# Patient Record
Sex: MALE | Race: WHITE | NOT HISPANIC OR LATINO | Employment: OTHER | URBAN - METROPOLITAN AREA
[De-identification: names, ages, dates, MRNs, and addresses within clinical notes are randomized per-mention and may not be internally consistent; named-entity substitution may affect disease eponyms.]

---

## 2021-03-14 ENCOUNTER — HISTORICAL (OUTPATIENT)
Dept: ADMINISTRATIVE | Facility: HOSPITAL | Age: 66
End: 2021-03-14

## 2021-11-18 ENCOUNTER — HISTORICAL (OUTPATIENT)
Dept: RADIOLOGY | Facility: HOSPITAL | Age: 66
End: 2021-11-18

## 2021-11-29 ENCOUNTER — HISTORICAL (OUTPATIENT)
Dept: ADMINISTRATIVE | Facility: HOSPITAL | Age: 66
End: 2021-11-29

## 2021-11-29 LAB
ALBUMIN SERPL-MCNC: 4.5 G/DL (ref 3.8–4.8)
ALBUMIN/GLOB SERPL: 2 {RATIO} (ref 1.2–2.2)
ALP SERPL-CCNC: 97 IU/L (ref 44–121)
ALT SERPL-CCNC: 16 IU/L (ref 0–44)
AST SERPL-CCNC: 18 IU/L (ref 0–40)
BILIRUB SERPL-MCNC: 0.4 MG/DL (ref 0–1.2)
BUN SERPL-MCNC: 12 MG/DL (ref 8–27)
CALCIUM SERPL-MCNC: 9.4 MG/DL (ref 8.6–10.2)
CHLORIDE SERPL-SCNC: 103 MMOL/L (ref 96–106)
CHOLEST SERPL-MCNC: 183 MG/DL (ref 100–199)
CHOLEST/HDLC SERPL: 3.5 RATIO (ref 0–5)
CO2 SERPL-SCNC: 22 MMOL/L (ref 20–29)
CREAT SERPL-MCNC: 0.78 MG/DL (ref 0.76–1.27)
CREAT/UREA NIT SERPL: 15 (ref 10–24)
GLOBULIN SER-MCNC: 2.2 G/DL (ref 1.5–4.5)
GLUCOSE SERPL-MCNC: 112 MG/DL (ref 65–99)
HDLC SERPL-MCNC: 52 MG/DL
LDLC SERPL CALC-MCNC: 109 MG/DL (ref 0–99)
POTASSIUM SERPL-SCNC: 4.2 MMOL/L (ref 3.5–5.2)
PROT SERPL-MCNC: 6.7 G/DL (ref 6–8.5)
SODIUM SERPL-SCNC: 139 MMOL/L (ref 134–144)
TRIGL SERPL-MCNC: 121 MG/DL (ref 0–149)
VLDLC SERPL CALC-MCNC: 22 MG/DL (ref 5–40)

## 2021-12-02 ENCOUNTER — HISTORICAL (OUTPATIENT)
Dept: ADMINISTRATIVE | Facility: HOSPITAL | Age: 66
End: 2021-12-02

## 2022-04-10 ENCOUNTER — HISTORICAL (OUTPATIENT)
Dept: ADMINISTRATIVE | Facility: HOSPITAL | Age: 67
End: 2022-04-10

## 2022-04-25 VITALS
HEIGHT: 70 IN | SYSTOLIC BLOOD PRESSURE: 151 MMHG | DIASTOLIC BLOOD PRESSURE: 73 MMHG | WEIGHT: 180.31 LBS | BODY MASS INDEX: 25.81 KG/M2 | OXYGEN SATURATION: 95 %

## 2022-07-22 PROBLEM — F10.10 ALCOHOL ABUSE: Chronic | Status: ACTIVE | Noted: 2022-07-22

## 2022-07-22 PROBLEM — M1A.09X0 IDIOPATHIC CHRONIC GOUT OF MULTIPLE SITES WITHOUT TOPHUS: Status: ACTIVE | Noted: 2022-07-22

## 2022-07-22 PROBLEM — I10 PRIMARY HYPERTENSION: Chronic | Status: ACTIVE | Noted: 2022-07-22

## 2022-07-22 PROBLEM — Z72.0 TOBACCO ABUSE: Status: ACTIVE | Noted: 2022-07-22

## 2022-07-22 PROBLEM — N20.0 NEPHROLITHIASIS: Chronic | Status: ACTIVE | Noted: 2022-07-22

## 2022-07-22 PROBLEM — I10 PRIMARY HYPERTENSION: Status: ACTIVE | Noted: 2022-07-22

## 2022-07-22 PROBLEM — M1A.09X0 IDIOPATHIC CHRONIC GOUT OF MULTIPLE SITES WITHOUT TOPHUS: Chronic | Status: ACTIVE | Noted: 2022-07-22

## 2022-07-22 PROBLEM — F10.10 ALCOHOL ABUSE: Status: ACTIVE | Noted: 2022-07-22

## 2022-07-22 PROBLEM — B02.29 POST HERPETIC NEURALGIA: Chronic | Status: ACTIVE | Noted: 2022-07-22

## 2022-07-22 PROBLEM — N20.0 NEPHROLITHIASIS: Status: ACTIVE | Noted: 2022-07-22

## 2022-07-22 PROBLEM — L57.0 ACTINIC KERATOSIS: Status: ACTIVE | Noted: 2022-07-22

## 2022-07-22 PROBLEM — K57.90 DIVERTICULOSIS: Chronic | Status: ACTIVE | Noted: 2022-07-22

## 2022-07-22 PROBLEM — B02.29 POST HERPETIC NEURALGIA: Status: ACTIVE | Noted: 2022-07-22

## 2022-07-22 PROBLEM — E78.00 PURE HYPERCHOLESTEROLEMIA: Status: ACTIVE | Noted: 2022-07-22

## 2022-07-22 PROBLEM — Z72.0 TOBACCO ABUSE: Chronic | Status: ACTIVE | Noted: 2022-07-22

## 2022-07-22 PROBLEM — I71.40 ABDOMINAL AORTIC ANEURYSM (AAA) WITHOUT RUPTURE: Chronic | Status: ACTIVE | Noted: 2022-07-22

## 2022-07-22 PROBLEM — K57.90 DIVERTICULOSIS: Status: ACTIVE | Noted: 2022-07-22

## 2022-07-22 PROBLEM — I71.40 ABDOMINAL AORTIC ANEURYSM (AAA) WITHOUT RUPTURE: Status: ACTIVE | Noted: 2022-07-22

## 2022-07-22 PROBLEM — L57.0 ACTINIC KERATOSIS: Chronic | Status: ACTIVE | Noted: 2022-07-22

## 2022-07-22 PROBLEM — E78.00 PURE HYPERCHOLESTEROLEMIA: Chronic | Status: ACTIVE | Noted: 2022-07-22

## 2023-01-10 ENCOUNTER — HOSPITAL ENCOUNTER (OUTPATIENT)
Dept: RADIOLOGY | Facility: HOSPITAL | Age: 68
Discharge: HOME | End: 2023-01-10
Attending: INTERNAL MEDICINE
Payer: MEDICARE

## 2023-01-10 ENCOUNTER — HOSPITAL ENCOUNTER (OUTPATIENT)
Dept: CARDIOLOGY | Facility: HOSPITAL | Age: 68
Discharge: HOME | End: 2023-01-10
Attending: INTERNAL MEDICINE
Payer: MEDICARE

## 2023-01-10 DIAGNOSIS — I71.40 ABDOMINAL AORTIC ANEURYSM (AAA) WITHOUT RUPTURE: ICD-10-CM

## 2023-01-10 DIAGNOSIS — Z09 ENCOUNTER FOR FOLLOW-UP EXAMINATION AFTER COMPLETED TREATMENT FOR CONDITIONS OTHER THAN MALIGNANT NEOPLASM: ICD-10-CM

## 2023-01-10 DIAGNOSIS — I65.29 STENOSIS OF CAROTID ARTERY, UNSPECIFIED LATERALITY: ICD-10-CM

## 2023-01-10 DIAGNOSIS — I10 PRIMARY HYPERTENSION: ICD-10-CM

## 2023-01-10 LAB
LEFT CCA DIST DIAS: 12 CM/S
LEFT CCA DIST SYS: 59 CM/S
LEFT CCA PROX DIAS: 11 CM/S
LEFT CCA PROX SYS: 65 CM/S
LEFT ECA DIAS: 7 CM/S
LEFT ECA SYS: 103 CM/S
LEFT ICA DIST DIAS: 0 CM/S
LEFT ICA DIST SYS: 83 CM/S
LEFT ICA MID DIAS: 22 CM/S
LEFT ICA MID SYS: 89 CM/S
LEFT ICA PROX DIAS: 10 CM/S
LEFT ICA PROX SYS: 51 CM/S
LEFT VERTEBRAL DIAS: 10 CM/S
LEFT VERTEBRAL SYS: 64 CM/S
OHS CV CAROTID RIGHT ICA EDV HIGHEST: 15
OHS CV CAROTID ULTRASOUND LEFT ICA/CCA RATIO: 1.51
OHS CV CAROTID ULTRASOUND RIGHT ICA/CCA RATIO: 0.73
OHS CV PV CAROTID LEFT HIGHEST CCA: 65
OHS CV PV CAROTID LEFT HIGHEST ICA: 89
OHS CV PV CAROTID RIGHT HIGHEST CCA: 98
OHS CV PV CAROTID RIGHT HIGHEST ICA: 72
OHS CV US CAROTID LEFT HIGHEST EDV: 22
RIGHT CCA DIST DIAS: 14 CM/S
RIGHT CCA DIST SYS: 98 CM/S
RIGHT CCA PROX DIAS: 10 CM/S
RIGHT CCA PROX SYS: 82 CM/S
RIGHT ECA DIAS: 0 CM/S
RIGHT ECA SYS: 106 CM/S
RIGHT ICA DIST DIAS: 15 CM/S
RIGHT ICA DIST SYS: 72 CM/S
RIGHT ICA MID DIAS: 15 CM/S
RIGHT ICA MID SYS: 66 CM/S
RIGHT ICA PROX DIAS: 9 CM/S
RIGHT ICA PROX SYS: 67 CM/S
RIGHT VERTEBRAL DIAS: 0 CM/S
RIGHT VERTEBRAL SYS: 33 CM/S

## 2023-01-10 PROCEDURE — 93880 EXTRACRANIAL BILAT STUDY: CPT | Mod: 26,,, | Performed by: SPECIALIST

## 2023-01-10 PROCEDURE — 76775 US EXAM ABDO BACK WALL LIM: CPT | Mod: TC

## 2023-01-10 PROCEDURE — 93880 CV US DOPPLER CAROTID (CUPID ONLY): ICD-10-PCS | Mod: 26,,, | Performed by: SPECIALIST

## 2023-01-10 PROCEDURE — 93880 EXTRACRANIAL BILAT STUDY: CPT

## 2023-02-16 ENCOUNTER — HOSPITAL ENCOUNTER (OUTPATIENT)
Dept: RADIOLOGY | Facility: HOSPITAL | Age: 68
Discharge: HOME | End: 2023-02-16
Attending: INTERNAL MEDICINE
Payer: MEDICARE

## 2023-02-16 DIAGNOSIS — F17.210 SMOKING GREATER THAN 20 PACK YEARS: ICD-10-CM

## 2023-02-16 PROCEDURE — 71271 CT THORAX LUNG CANCER SCR C-: CPT | Mod: TC

## 2023-03-13 ENCOUNTER — CLINICAL SUPPORT (OUTPATIENT)
Dept: AUDIOLOGY | Facility: HOSPITAL | Age: 68
End: 2023-03-13
Payer: MEDICARE

## 2023-03-13 DIAGNOSIS — H91.93 BILATERAL HEARING LOSS, UNSPECIFIED HEARING LOSS TYPE: ICD-10-CM

## 2023-03-13 DIAGNOSIS — H90.3 SENSORINEURAL HEARING LOSS, BILATERAL: Primary | ICD-10-CM

## 2023-03-13 PROCEDURE — 92557 COMPREHENSIVE HEARING TEST: CPT | Performed by: AUDIOLOGIST

## 2023-03-13 PROCEDURE — 92567 TYMPANOMETRY: CPT | Performed by: AUDIOLOGIST

## 2023-03-13 NOTE — PROGRESS NOTES
Hearing Evaluation        Patient History: Mr. Barcenas is in for his initial hearing evaluation reporting a gradual decrease in hearing, onset years ago.  He also reports transient bilateral tinnitus and a history of occupational noise exposure.  Vertigo and middle ear issues are denied. All additional history is unremarkable.        Test Results:                    Pure Tone Testing:      Right ear:       Mild to moderately severe sensorineural hearing loss from 750-8kHz. Speech reception threshold is in agreement with puretone findings.  Discrimination score of 44% is considered poor.        Left ear:          Mild to moderately severe sensorineural hearing loss from 1k-8kHz. Speech reception threshold is in agreement with puretone findings.  Discrimination score of 88% is considered good.                                                                            Tympanometry:                                           Right ear:       could not seal      Left ear:          could not seal                                Interpretations:      Behavioral test findings indicate a slight asymmetric hearing loss at 2kHz (AD>AS). Speech reception thresholds obtained at 20dB, AU, and are in agreement with puretone findings. Speech discrimination scores of 44%, AD, and 88%, AS are considered poor/good.  Immittance measures unobtainable due to significant EAC hair preventing the ability to obtain a seal for measures, bilaterally.           Recommendations:   1. ENT medical evaluation due to newly identified asymmetric hearing loss and asymmetric speech discrim.  2. Amplification (most likely monaural due to poor discrim in right ear)   3. Annual hearing evaluations

## 2023-03-14 ENCOUNTER — OFFICE VISIT (OUTPATIENT)
Dept: OTOLARYNGOLOGY | Facility: CLINIC | Age: 68
End: 2023-03-14
Payer: MEDICARE

## 2023-03-14 VITALS
BODY MASS INDEX: 25.2 KG/M2 | SYSTOLIC BLOOD PRESSURE: 134 MMHG | HEART RATE: 68 BPM | WEIGHT: 176 LBS | DIASTOLIC BLOOD PRESSURE: 71 MMHG | TEMPERATURE: 98 F

## 2023-03-14 DIAGNOSIS — H90.3 SENSORINEURAL HEARING LOSS (SNHL) OF BOTH EARS: Primary | ICD-10-CM

## 2023-03-14 PROCEDURE — 99214 OFFICE O/P EST MOD 30 MIN: CPT | Mod: PBBFAC | Performed by: OTOLARYNGOLOGY

## 2023-03-14 NOTE — PROGRESS NOTES
Patient Name: Etienne Barcenas   YOB: 1955     Chief Complaint:   Chief Complaint   Patient presents with    bilateral hearing loss        History of Present Illness:  March 14, 2023:   67-year-old male with a history of occupational noise exposure referred by audiology for evaluation of asymmetric hearing loss.  The patient has noticed that for at least the past 10 years he has been developing hearing loss which has progressively worsened.  He has noticed that the hearing seems to be worse in the right ear.  He does have intermittent tinnitus involving both ears and this was also worse in the right ear.  Currently he has no ear pain or vertigo.  No headaches.  He does have a history of childhood ear infections but has not had any for sometimes.  No known history of ototoxic drug exposure.  Regarding his occupational noise exposure he does work as an  in his frequently working around compressors.  He will usually wear hearing protection when in those environments.  He does not wear hearing protection at home when doing things such as cutting grass.  Family history is unremarkable for hearing loss the patient is aware of.    He is noticed that been having more difficulty following conversations and frequently has to ask people to repeat things that to him.    Audiogram done yesterday showed asymmetric sensorineural hearing loss with the right ear being worse than the left.  In the right ear he had a mild-to-moderately severe sensorineural hearing loss 750-8000 hertz.  In the left ear he had a mild-to-moderately severe sensorineural hearing loss from 3510-9802 hertz.  Discrimination was 44% in the right ear and 88% in left ear with speech reception threshold of 20 decibels bilaterally.  Those results were reviewed with the patient today.  Tympanometry could not be obtained.    Past Medical History:  Past Medical History:   Diagnosis Date    Actinic keratosis 7/22/2022    Alcohol abuse  7/22/2022    Diverticulosis 7/22/2022    Idiopathic chronic gout of multiple sites without tophus 7/22/2022    Nephrolithiasis 7/22/2022    Post herpetic neuralgia 7/22/2022    Pure hypercholesterolemia 7/22/2022    Tobacco abuse 7/22/2022     History reviewed. No pertinent surgical history.    Review of Systems:  Unremarkable except as mentioned above.    Current Medications:  Current Outpatient Medications   Medication Sig    allopurinoL (ZYLOPRIM) 300 MG tablet TAKE ONE TABLET BY MOUTH EVERY DAY    amLODIPine (NORVASC) 10 MG tablet Take 10 mg by mouth once daily.    aspirin (ECOTRIN) 81 MG EC tablet Take 81 mg by mouth once daily.    carvediloL (COREG) 3.125 MG tablet Take 1 tablet (3.125 mg total) by mouth 2 (two) times daily with meals.    losartan (COZAAR) 50 MG tablet Take 1 tablet (50 mg total) by mouth once daily.    rosuvastatin (CRESTOR) 5 MG tablet Take 5 mg by mouth once daily.    temazepam (RESTORIL) 15 mg Cap 1-2 caps nightly as needed for insomnia    diclofenac sodium (VOLTAREN) 1 % Gel Apply 2 g topically 4 (four) times daily as needed (pain). (Patient not taking: Reported on 3/14/2023)     No current facility-administered medications for this visit.        Allergies:  Review of patient's allergies indicates:   Allergen Reactions    Fish containing products         Physical Exam:  Vital signs:   Vitals:    03/14/23 1310   BP: 134/71   Pulse: 68   Temp: 97.7 °F (36.5 °C)   Weight: 79.8 kg (176 lb)   General:  Well-developed well-nourished male in no acute distress.  Voice is normal.  Head and face:  Normocephalic.  No facial lesions.  No temporomandibular joint tenderness or click.  Ears:  Right ear-auricle is normally developed.  External auditory canal is clear.  Tympanic membrane is nonerythematous.  No middle ear effusion.  Left ear-auricle is normally developed.  External auditory canal is clear.  Tympanic membrane is nonerythematous.  No middle ear effusion.  Nose:  Nasal dorsum is  unremarkable.  No significant septal deviation.  No significant intranasal congestion.  Secretions are clear.  Oral cavity and oropharynx:  Tongue and floor mouth are without lesions.  Mucosa is moist.  No pharyngeal erythema or exudates.  No oropharyngeal masses.  No tonsillar hypertrophy.  Neck:  Supple without adenopathy or thyromegaly.  Trachea is in the midline.  Parotid and submandibular glands are normal to palpation without masses or tenderness.  Eyes: Pupils equal round and reactive to light.  Extraocular movements intact.  No nystagmus.    Neurologic:  Alert and oriented x3.  Cranial nerves 2-12 grossly intact.  Gait is normal.      Assessment/Plan:  67-year-old male with a history of occupational noise exposure with asymmetric sensorineural hearing loss.      Plan:  Continue hearing protection.    MRI scan of the temporal bones with and without contrast to evaluate for retrocochlear pathology.  I did discuss with the patient consideration for amplification in at least the left ear.  This will be discussed further at his follow-up.  Follow-up in 1 month.        Lowell Rodriguez M.D.

## 2023-03-29 ENCOUNTER — HOSPITAL ENCOUNTER (OUTPATIENT)
Dept: RADIOLOGY | Facility: HOSPITAL | Age: 68
Discharge: HOME | End: 2023-03-29
Attending: OTOLARYNGOLOGY
Payer: MEDICARE

## 2023-03-29 DIAGNOSIS — H90.3 SENSORINEURAL HEARING LOSS (SNHL) OF BOTH EARS: ICD-10-CM

## 2023-03-29 LAB
CREAT SERPL-MCNC: 0.83 MG/DL (ref 0.73–1.18)
GFR SERPLBLD CREATININE-BSD FMLA CKD-EPI: >60 MLS/MIN/1.73/M2

## 2023-03-29 PROCEDURE — 82565 ASSAY OF CREATININE: CPT | Performed by: OTOLARYNGOLOGY

## 2023-03-29 PROCEDURE — 25500020 PHARM REV CODE 255

## 2023-03-29 PROCEDURE — 70553 MRI BRAIN STEM W/O & W/DYE: CPT | Mod: TC

## 2023-03-29 PROCEDURE — A9577 INJ MULTIHANCE: HCPCS

## 2023-03-29 RX ADMIN — GADOBENATE DIMEGLUMINE 20 ML: 529 INJECTION, SOLUTION INTRAVENOUS at 02:03

## 2023-04-20 ENCOUNTER — OFFICE VISIT (OUTPATIENT)
Dept: OTOLARYNGOLOGY | Facility: CLINIC | Age: 68
End: 2023-04-20
Payer: MEDICARE

## 2023-04-20 VITALS
WEIGHT: 176 LBS | BODY MASS INDEX: 25.2 KG/M2 | TEMPERATURE: 98 F | HEIGHT: 70 IN | RESPIRATION RATE: 16 BRPM | DIASTOLIC BLOOD PRESSURE: 72 MMHG | HEART RATE: 59 BPM | SYSTOLIC BLOOD PRESSURE: 132 MMHG

## 2023-04-20 DIAGNOSIS — H90.3 SENSORINEURAL HEARING LOSS (SNHL) OF BOTH EARS: Primary | ICD-10-CM

## 2023-04-20 PROCEDURE — 99214 OFFICE O/P EST MOD 30 MIN: CPT | Mod: PBBFAC | Performed by: OTOLARYNGOLOGY

## 2023-04-20 NOTE — PROGRESS NOTES
Patient Name: Etienne Barcenas   YOB: 1955     Chief Complaint:   No chief complaint on file.       History of Present Illness:  March 14, 2023:   67-year-old male with a history of occupational noise exposure referred by audiology for evaluation of asymmetric hearing loss.  The patient has noticed that for at least the past 10 years he has been developing hearing loss which has progressively worsened.  He has noticed that the hearing seems to be worse in the right ear.  He does have intermittent tinnitus involving both ears and this was also worse in the right ear.  Currently he has no ear pain or vertigo.  No headaches.  He does have a history of childhood ear infections but has not had any for sometimes.  No known history of ototoxic drug exposure.  Regarding his occupational noise exposure he does work as an  in his frequently working around compressors.  He will usually wear hearing protection when in those environments.  He does not wear hearing protection at home when doing things such as cutting grass.  Family history is unremarkable for hearing loss the patient is aware of.    He is noticed that been having more difficulty following conversations and frequently has to ask people to repeat things that to him.    Audiogram done yesterday showed asymmetric sensorineural hearing loss with the right ear being worse than the left.  In the right ear he had a mild-to-moderately severe sensorineural hearing loss 750-8000 hertz.  In the left ear he had a mild-to-moderately severe sensorineural hearing loss from 1678-3542 hertz.  Discrimination was 44% in the right ear and 88% in left ear with speech reception threshold of 20 decibels bilaterally.  Those results were reviewed with the patient today.  Tympanometry could not be obtained.    April 20, 2023:   Patient presents today for follow-up of his asymmetric sensorineural hearing loss in review of his MRI scan of the internal auditory  canals which was done on March 29th.  The scans did not show evidence of any retrocochlear pathology.  No other abnormalities were noted on the scan other than some mild chronic microvascular ischemic changes.  Results were discussed with the patient.  He has no other new complaints today.    Past Medical History:  Past Medical History:   Diagnosis Date    Actinic keratosis 7/22/2022    Alcohol abuse 7/22/2022    Diverticulosis 7/22/2022    Idiopathic chronic gout of multiple sites without tophus 7/22/2022    Nephrolithiasis 7/22/2022    Post herpetic neuralgia 7/22/2022    Pure hypercholesterolemia 7/22/2022    Tobacco abuse 7/22/2022     History reviewed. No pertinent surgical history.    Review of Systems:  Unremarkable except as mentioned above.    Current Medications:  Current Outpatient Medications   Medication Sig    allopurinoL (ZYLOPRIM) 300 MG tablet TAKE ONE TABLET BY MOUTH EVERY DAY    amLODIPine (NORVASC) 10 MG tablet Take 10 mg by mouth once daily.    aspirin (ECOTRIN) 81 MG EC tablet Take 81 mg by mouth once daily.    carvediloL (COREG) 3.125 MG tablet Take 1 tablet (3.125 mg total) by mouth 2 (two) times daily with meals.    losartan (COZAAR) 50 MG tablet Take 1 tablet (50 mg total) by mouth once daily.    rosuvastatin (CRESTOR) 5 MG tablet Take 5 mg by mouth once daily.    temazepam (RESTORIL) 15 mg Cap 1-2 caps nightly as needed for insomnia    diclofenac sodium (VOLTAREN) 1 % Gel Apply 2 g topically 4 (four) times daily as needed (pain). (Patient not taking: Reported on 3/14/2023)     No current facility-administered medications for this visit.        Allergies:  Review of patient's allergies indicates:   Allergen Reactions    Fish containing products         Physical Exam:  Vital signs:   Vitals:    04/20/23 0941   BP: 132/72   BP Location: Right arm   Patient Position: Sitting   BP Method: Medium (Automatic)   Pulse: (!) 59   Resp: 16   Temp: 98 °F (36.7 °C)   TempSrc: Oral   Weight: 79.8 kg  "(176 lb)   Height: 5' 10" (1.778 m)   General:  Well-developed well-nourished male in no acute distress.  Voice is normal.  Head and face:  Normocephalic.  No facial lesions.  No temporomandibular joint tenderness or click.  Ears:  Right ear-auricle is normally developed.  External auditory canal is clear.  Tympanic membrane is nonerythematous.  No middle ear effusion.  Left ear-auricle is normally developed.  External auditory canal is clear.  Tympanic membrane is nonerythematous.  No middle ear effusion.  Nose:  Nasal dorsum is unremarkable.  No significant septal deviation.  No significant intranasal congestion.  Secretions are clear.  Oral cavity and oropharynx:  Tongue and floor mouth are without lesions.  Mucosa is moist.  No pharyngeal erythema or exudates.  No oropharyngeal masses.  No tonsillar hypertrophy.  Neck:  Supple without adenopathy or thyromegaly.  Trachea is in the midline.  Parotid and submandibular glands are normal to palpation without masses or tenderness.  Eyes: Pupils equal round and reactive to light.  Extraocular movements intact.  No nystagmus.    Neurologic:  Alert and oriented x3.  Cranial nerves 2-12 grossly intact.  Gait is normal.      Assessment/Plan:  67-year-old male with a history of occupational noise exposure with asymmetric sensorineural hearing loss; no evidence of retrocochlear pathology.      Plan:  Continue hearing protection.    Discussed hearing aids with the patient.  He would like to explore this possibility.  Referral will be made back to audiology for further evaluation.    Follow-up here in 1 year with preclinic audiogram.    Lowell Rodriguez M.D.      "

## 2023-07-26 PROBLEM — F17.200 TOBACCO USE DISORDER: Status: ACTIVE | Noted: 2022-07-22

## 2023-08-09 PROBLEM — F17.200 TOBACCO USE DISORDER: Chronic | Status: ACTIVE | Noted: 2022-07-22

## 2024-02-12 PROBLEM — I25.10 CORONARY ARTERY DISEASE INVOLVING NATIVE CORONARY ARTERY OF NATIVE HEART WITHOUT ANGINA PECTORIS: Chronic | Status: ACTIVE | Noted: 2024-02-12

## 2024-02-12 PROBLEM — I25.10 CORONARY ARTERY DISEASE INVOLVING NATIVE CORONARY ARTERY OF NATIVE HEART WITHOUT ANGINA PECTORIS: Status: ACTIVE | Noted: 2024-02-12

## 2024-02-12 PROBLEM — M79.89 SWELLING OF RIGHT UPPER EXTREMITY: Status: ACTIVE | Noted: 2024-02-12

## 2024-02-20 ENCOUNTER — HOSPITAL ENCOUNTER (OUTPATIENT)
Dept: RADIOLOGY | Facility: HOSPITAL | Age: 69
Discharge: HOME | End: 2024-02-20
Attending: INTERNAL MEDICINE
Payer: MEDICARE

## 2024-02-20 DIAGNOSIS — Z87.891 PERSONAL HISTORY OF SMOKING: ICD-10-CM

## 2024-02-20 PROCEDURE — 71271 CT THORAX LUNG CANCER SCR C-: CPT | Mod: TC

## 2024-07-24 PROBLEM — F10.10 ALCOHOL ABUSE: Chronic | Status: RESOLVED | Noted: 2022-07-22 | Resolved: 2024-07-24

## 2024-07-24 PROBLEM — M79.89 SWELLING OF RIGHT UPPER EXTREMITY: Status: RESOLVED | Noted: 2024-02-12 | Resolved: 2024-07-24

## 2025-03-05 ENCOUNTER — HOSPITAL ENCOUNTER (OUTPATIENT)
Dept: RADIOLOGY | Facility: HOSPITAL | Age: 70
Discharge: HOME | End: 2025-03-05
Attending: INTERNAL MEDICINE
Payer: MEDICARE

## 2025-03-05 DIAGNOSIS — Z87.891 PERSONAL HISTORY OF SMOKING: ICD-10-CM

## 2025-03-05 PROCEDURE — 71271 CT THORAX LUNG CANCER SCR C-: CPT | Mod: TC
